# Patient Record
Sex: MALE | Race: WHITE | Employment: FULL TIME | URBAN - METROPOLITAN AREA
[De-identification: names, ages, dates, MRNs, and addresses within clinical notes are randomized per-mention and may not be internally consistent; named-entity substitution may affect disease eponyms.]

---

## 2019-07-18 ENCOUNTER — HOSPITAL ENCOUNTER (OUTPATIENT)
Dept: PHYSICAL THERAPY | Age: 49
Discharge: HOME | End: 2019-07-18
Payer: COMMERCIAL

## 2019-07-18 PROCEDURE — 97110 THERAPEUTIC EXERCISES: CPT

## 2019-07-18 PROCEDURE — 97161 PT EVAL LOW COMPLEX 20 MIN: CPT

## 2019-07-18 PROCEDURE — 97140 MANUAL THERAPY 1/> REGIONS: CPT
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
## 2019-07-18 NOTE — PROGRESS NOTES
PHYSICAL THERAPY - DAILY TREATMENT NOTE    Patient Name: Cherise Yoder        Date: 2019  : 1970   yes Patient  Verified  Visit #:   1     Insurance: Payor: Jodi Caballero / Plan: [address] [facility] PT / Product Type: Commerical /      In time: 3:42 PM Out time: 4:26 PM   Total Treatment Time: 44     Medicare/[facility] Time Tracking (below)   Total Timed Codes (min):  44 1:1 Treatment Time:  n/a     TREATMENT AREA =  Left shoulder pain [M25.512]    SUBJECTIVE  Pain Level (on 0 to 10 scale):  2  / 10   Medication Changes/New allergies or changes in medical history, any new surgeries or procedures?    no  If yes, update Summary List   Subjective Functional Status/Changes:  []  No changes reported     See POC          OBJECTIVE    See exam on chart for details on objective findings        10 min Therapeutic Exercise:  [x]  See flow sheet   Rationale:      increase ROM and increase strength to improve the patients ability to dress and perform household ADL's     10 min Manual Therapy: STM L periscap mm's in R S/L and pecs in supine.   PROM all planes to tolerance (gentle ROM)   Rationale:      decrease pain, increase ROM, increase tissue extensibility and decrease trigger points to improve patient's ability to change/maintain body positions      Billed With/As:   [x] TE   [] TA   [] Neuro   [] Self Care Patient Education: [x] Review HEP    [] Progressed/Changed HEP based on:   [] positioning   [] body mechanics   [] transfers   [] heat/ice application    [x] other:      Other Objective/Functional Measures:    See POC     Post Treatment Pain Level (on 0 to 10) scale:   1  / 10     ASSESSMENT  Assessment/Changes in Function:     See POC     []  See Progress Note/Recertification   Patient will continue to benefit from skilled PT services to modify and progress therapeutic interventions, address ROM deficits, address strength deficits, analyze and address soft tissue restrictions, analyze and cue movement patterns, analyze and modify body mechanics/ergonomics and instruct in home and community integration to attain remaining goals. Progress toward goals / Updated goals:    See POC     PLAN  []  Upgrade activities as tolerated yes Continue plan of care   []  Discharge due to :    []  Other:      Therapist: Arnaldo Davies.  Anna PT    Date: 7/18/2019 Time: 12:20 PM     Future Appointments   Date Time Provider Sherry Dipika   7/23/2019  2:00 PM Perla Corrales, PT MMCPTCP [facility]   7/25/2019  3:45 PM Perla Corrales, PT [id number] [facility]   7/30/2019  1:15 PM Babs RAINES, PT UHEPOBC [facility]   8/1/2019  2:15 PM Perla Corrales, PT [id number] [facility]   8/5/2019  7:45 AM Perla Corrales, PT [facility]CP[facility]   8/7/2019  3:00 PM Antelmo Lion, PT [facility]   8/12/2019  7:00 AM Babs RAINES, PT [facility]   8/14/2019  3:00 PM Antelmo Lion, PT [facility]   8/20/2019  7:00 AM Massiel Santillan, PTA [facility]CP[facility]   8/22/2019  7:00 AM Massiel Santillan PTA [facility]CP[facility]   8/27/2019  7:00 AM Massiel Santillan PTA [facility]CP[facility]   8/29/2019  7:00 AM Massiel Santillan PTA [facility]CP[facility]

## 2019-07-23 ENCOUNTER — HOSPITAL ENCOUNTER (OUTPATIENT)
Dept: PHYSICAL THERAPY | Age: 49
Discharge: HOME | End: 2019-07-23
Payer: COMMERCIAL

## 2019-07-23 PROCEDURE — 97140 MANUAL THERAPY 1/> REGIONS: CPT

## 2019-07-23 PROCEDURE — 97110 THERAPEUTIC EXERCISES: CPT

## 2019-07-23 NOTE — PROGRESS NOTES
PHYSICAL THERAPY - DAILY TREATMENT NOTE    Patient Name: Emeterio Dee        Date: 2019  : 1970   yes Patient  Verified  Visit #:   2     Insurance: Payor: Justine Johnston / Plan: [address] PT / Product Type: Commerical /      In time: 1:57 Out time: 2:46   Total Treatment Time: 49     Medicare/[facility] Time Tracking (below)   Total Timed Codes (min):  49 1:1 Treatment Time:  n/a     TREATMENT AREA =  Left shoulder pain [M25.512]    SUBJECTIVE  Pain Level (on 0 to 10 scale):  1  / 10   Medication Changes/New allergies or changes in medical history, any new surgeries or procedures?    no  If yes, update Summary List   Subjective Functional Status/Changes:  []  No changes reported   I have been doing my stretches at home. OBJECTIVE    39 min Therapeutic Exercise:  [x]  See flow sheet   Rationale:      increase ROM and increase strength to improve the patients ability to dress and perform household ADL's     10 min Manual Therapy: STM L periscap mm's in R S/L and pecs in supine. PROM all planes to tolerance (gentle ROM) with oscillations   Rationale:      decrease pain, increase ROM, increase tissue extensibility and decrease trigger points to improve patient's ability to change/maintain body positions      Billed With/As:   [x] TE   [] TA   [] Neuro   [] Self Care Patient Education: [x] Review HEP    [] Progressed/Changed HEP based on:   [] positioning   [] body mechanics   [] transfers   [] heat/ice application    [x] other:      Other Objective/Functional Measures:   - Demo and cuing for all TE  - No pain with exercises  - Minimal pain with PROM   Post Treatment Pain Level (on 0 to 10) scale:   0  / 10     ASSESSMENT  Assessment/Changes in Function:   Initiated therex today per flow sheet, requiring vc and demo 100% of the time for proper form and technique. Good effort by patient with no increase in pain, good return demo.  Patient declined ice and denies pain at conclusion of session. []  See Progress Note/Recertification   Patient will continue to benefit from skilled PT services to modify and progress therapeutic interventions, address ROM deficits, address strength deficits, analyze and address soft tissue restrictions, analyze and cue movement patterns, analyze and modify body mechanics/ergonomics and instruct in home and community integration to attain remaining goals. Progress toward goals / Updated goals:  First visit since evaluation, no change yet.      PLAN  []  Upgrade activities as tolerated yes Continue plan of care   []  Discharge due to :    []  Other:      Therapist: SHAWNA Han    Date: 7/23/2019 Time: 2:46 PM     Future Appointments   Date Time Provider Sherry Bar   7/23/2019  2:00 PM [facility] PT ESTELA CALABRESED 1 MMCP[facility]   7/25/2019  3:45 PM Dawne Goodpasture., PT [facility]   7/30/2019  1:15 PM Leticia RAINES, PT [id number] [facility]   8/1/2019  2:15 PM Dawne Goodpasture., PT MMCPTCP [facility]   8/5/2019  7:45 AM Leticia RAINES, PT MMCPTCP [facility]   8/7/2019  3:00 PM Estephania Madera, PT MMCPTCP [facility]   8/12/2019  7:00 AM Leticia RAINES, PT MMCPTCP [facility]   8/14/2019  3:00 PM Estephania Madera, PT MMCPTCP [facility]   8/20/2019  7:00 AM Kal Enter, PTA MMCPTCP [facility]   8/22/2019  7:00 AM Kal Enter, PTA MMCPTCP [facility]   8/27/2019  7:00 AM Kal Enter, PTA MMCPTCP [facility]   8/29/2019  7:00 AM Kal Enter, PTA [facility]

## 2019-07-25 ENCOUNTER — HOSPITAL ENCOUNTER (OUTPATIENT)
Dept: PHYSICAL THERAPY | Age: 49
Discharge: HOME | End: 2019-07-25
Payer: COMMERCIAL

## 2019-07-25 PROCEDURE — 97110 THERAPEUTIC EXERCISES: CPT

## 2019-07-25 PROCEDURE — 97140 MANUAL THERAPY 1/> REGIONS: CPT

## 2019-07-25 NOTE — PROGRESS NOTES
PHYSICAL THERAPY - DAILY TREATMENT NOTE    Patient Name: Tyron Tao        Date: 2019  : 1970   yes Patient  Verified  Visit #:   3     Insurance: Payor: Chelo Overall / [address]: [address] / Product Type: Commerical /      In time: 3:42 PM Out time: 4:23   Total Treatment Time: 41     Medicare/[employer] Time Tracking (below)   Total Timed Codes (min):  36 1:1 Treatment Time:  n/a     TREATMENT AREA =  Left shoulder pain [M25.512]    SUBJECTIVE  Pain Level (on 0 to 10 scale):  1  / 10   Medication Changes/New allergies or changes in medical history, any new surgeries or procedures?    no  If yes, update Summary List   Subjective Functional Status/Changes:  []  No changes reported   Pt states he has been trying to keep his arm limber (demonstrates shoulder circles; pt was advised on precautions and to stick to HEP for AAROM). OBJECTIVE    26 min Therapeutic Exercise:  [x]  See flow sheet   Rationale:      increase ROM and increase strength to improve the patients ability to dress and perform household ADL's     10 min Manual Therapy: STM L periscap mm's in R S/L and pecs in supine. PROM all planes to tolerance (gentle ROM) with oscillations   Rationale:      decrease pain, increase ROM, increase tissue extensibility and decrease trigger points to improve patient's ability to change/maintain body positions      Billed With/As:   [x] TE   [] TA   [] Neuro   [] Self Care Patient Education: [x] Review HEP    [] Progressed/Changed HEP based on:   [] positioning   [] body mechanics   [] transfers   [] heat/ice application    [x] other:      Other Objective/Functional Measures:     -added prone letters; pt reported discomfort/pain after completing M and W's (plan to d/c next session). Post Treatment Pain Level (on 0 to 10) scale:   0  / 10     ASSESSMENT  Assessment/Changes in Function:   Pt demonstrates good improvement with AA/PROM to L shoulder.   Shoulder scaption ~ 160 PROM today, flex ~ 150. Pt with c/o impingement type pain with attempted pec stretching and scap exercises. Continue to monitor for skilled progression to goals. []  See Progress Note/Recertification   Patient will continue to benefit from skilled PT services to modify and progress therapeutic interventions, address ROM deficits, address strength deficits, analyze and address soft tissue restrictions, analyze and cue movement patterns, analyze and modify body mechanics/ergonomics and instruct in home and community integration to attain remaining goals. Progress toward goals / Updated goals:       PLAN  []  Upgrade activities as tolerated yes Continue plan of care   []  Discharge due to :    []  Other:      Therapist: Leandra Vicente.  Anna PT, LPTA    Date: 7/25/2019 Time: 4:25 PM      Future Appointments   Date Time Provider Sherry Bar   7/25/2019  3:45 PM Jayie Asp., PT [facility]   7/30/2019  1:15 PM Mary Alice RAINES, PT IFGULIZ [facility]   8/1/2019  2:15 PM Bella Asp., PT PMSEAOQ [facility]   8/5/2019  7:45 AM Bella Asp., PT [facility]CP[facility]   8/7/2019  3:00 PM Drea Jessica, PT [facility]CPTCP [facility]   8/12/2019  7:00 AM Mary Alice RAINES, PT [facility]CPTCP [facility]   8/14/2019  3:00 PM Drea Jessica, PT [facility]CPTCP [facility]   8/20/2019  7:00 AM Claude Vazquez, PTA [facility]CP[facility]   8/22/2019  7:00 AM Claude Vazquez PTA [facility]CPTCP [facility]   8/27/2019  7:00 AM Claude Vazquez PTA [facility]CPTCP [facility]   8/29/2019  7:00 AM Claude Vazquez PTA [facility]CPTCP [facility]

## 2019-07-30 ENCOUNTER — HOSPITAL ENCOUNTER (OUTPATIENT)
Dept: PHYSICAL THERAPY | Age: 49
Discharge: HOME | End: 2019-07-30
Payer: COMMERCIAL

## 2019-07-30 PROCEDURE — 97110 THERAPEUTIC EXERCISES: CPT

## 2019-07-30 PROCEDURE — 97140 MANUAL THERAPY 1/> REGIONS: CPT

## 2019-07-30 NOTE — PROGRESS NOTES
PHYSICAL THERAPY - DAILY TREATMENT NOTE    Patient Name: Naomi Fry        Date: 2019  : 1970   yes Patient  Verified  Visit #:   4     Insurance: Payor: Nazanin Lai / Plan: [address] PT / Product Type: Commerical /      In time: 1:18 PM Out time: 2:01   Total Treatment Time: 43     Medicare/[facility] Time Tracking (below)   Total Timed Codes (min):  38 1:1 Treatment Time:  n/a     TREATMENT AREA =  Left shoulder pain [M25.512]    SUBJECTIVE  Pain Level (on 0 to 10 scale):  1  / 10   Medication Changes/New allergies or changes in medical history, any new surgeries or procedures?    no  If yes, update Summary List   Subjective Functional Status/Changes:  []  No changes reported     Pt states he fell asleep in a recliner yesterday and woke up with his shoulder feeling significantly better. Continues to have tightness to his lats. OBJECTIVE    28 min Therapeutic Exercise:  [x]  See flow sheet   Rationale:      increase ROM and increase strength to improve the patients ability to dress and perform household ADL's     10 min Manual Therapy: STM L periscap mm's in R S/L and pecs in supine. PROM all planes to tolerance (gentle ROM) with oscillations. Lat stretch   Rationale:      decrease pain, increase ROM, increase tissue extensibility and decrease trigger points to improve patient's ability to change/maintain body positions      Billed With/As:   [x] TE   [] TA   [] Neuro   [] Self Care Patient Education: [x] Review HEP    [] Progressed/Changed HEP based on:   [] positioning   [] body mechanics   [] transfers   [] heat/ice application    [x] other:      Other Objective/Functional Measures:     -added strap IR stretch, lat stretch, supine scap retraction   Post Treatment Pain Level (on 0 to 10) scale:   0  / 10     ASSESSMENT  Assessment/Changes in Function:   Pt with good tolerance for all exercises performed without increased pain reported.   Pt continues to adhere to MD guidelines. Will be leaving [facility] on 8/8 and unable to attend MD f/u; pt working on contacting MD office to re-schedule. []  See Progress Note/Recertification   Patient will continue to benefit from skilled PT services to modify and progress therapeutic interventions, address ROM deficits, address strength deficits, analyze and address soft tissue restrictions, analyze and cue movement patterns, analyze and modify body mechanics/ergonomics and instruct in home and community integration to attain remaining goals. Progress toward goals / Updated goals:    Met STG 1,2     PLAN  []  Upgrade activities as tolerated yes Continue plan of care   []  Discharge due to :    []  Other:      Therapist: Balbina Dodd, PT, LPTA    Date: 7/30/2019 Time: 4:25 PM      Future Appointments   Date Time Provider Sherry Bar   8/1/2019  2:15 PM Joleen Lesches., PT [facility]   8/5/2019  7:45 AM Conchita Buerger D., PT [facility]   8/7/2019  3:00 PM Nixon Alvarado, PT [facility]CPTCP [facility]   8/12/2019  7:00 AM Conchita Buerger D., PT [facility]CP[facility]   8/14/2019  3:00 PM Nixon Alvarado, PT [facility]CPTCP [facility]   8/20/2019  7:00 AM Daivd Class, PTA MMCPTCP [facility]   8/22/2019  7:00 AM Daivd Class, PTA MMCPTCP [facility]   8/27/2019  7:00 AM Daivd Class, PTA MMCPTCP [facility]   8/29/2019  7:00 AM Daivd Class, PTA MMCPTCP [facility]

## 2019-08-01 ENCOUNTER — HOSPITAL ENCOUNTER (OUTPATIENT)
Dept: PHYSICAL THERAPY | Age: 49
Discharge: HOME | End: 2019-08-01
Payer: COMMERCIAL

## 2019-08-01 PROCEDURE — 97110 THERAPEUTIC EXERCISES: CPT

## 2019-08-01 PROCEDURE — 97140 MANUAL THERAPY 1/> REGIONS: CPT
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
## 2019-08-05 ENCOUNTER — HOSPITAL ENCOUNTER (OUTPATIENT)
Dept: PHYSICAL THERAPY | Age: 49
Discharge: HOME | End: 2019-08-05
Payer: COMMERCIAL

## 2019-08-05 PROCEDURE — 97140 MANUAL THERAPY 1/> REGIONS: CPT

## 2019-08-05 PROCEDURE — 97110 THERAPEUTIC EXERCISES: CPT

## 2019-08-05 NOTE — PROGRESS NOTES
PHYSICAL THERAPY - DAILY TREATMENT NOTE    Patient Name: Jocy Khan        Date: 2019  : 1970   yes Patient  Verified  Visit #:   6     Insurance: Payor: Va Salas / Plan: [address] PT / Product Type: Commerical /      In time: 7:47  AM Out time: 8:45 AM   Total Treatment Time: 58     Medicare/BCBS Time Tracking (below)   Total Timed Codes (min):  43 1:1 Treatment Time:  n/a     TREATMENT AREA =  Left shoulder pain [M25.512]    SUBJECTIVE  Pain Level (on 0 to 10 scale):  1  / 10   Medication Changes/New allergies or changes in medical history, any new surgeries or procedures?    no  If yes, update Summary List   Subjective Functional Status/Changes:  []  No changes reported     \"I woke up at 4 AM yesterday morning with 5/10 pain; went down to the recliner to get some sleep\".        OBJECTIVE  Modality rationale: decrease pain to improve the patients ability to reach for ADL's   Min Type Additional Details    [] Estim:  []Unatt       []IFC  []Premod                        []Other:  []w/ice   []w/heat  Position:  Location:    [] Estim: []Att    []TENS instruct  []NMES                    []Other:  []w/US   []w/ice   []w/heat  Position:  Location:    []  Traction: [] Cervical       []Lumbar                       [] Prone          []Supine                       []Intermittent   []Continuous Lbs:  [] before manual  [] after manual    []  Ultrasound: []Continuous   [] Pulsed                           []1MHz   []3MHz Location:  W/cm2:    []  Iontophoresis with dexamethasone         Location: [] Take home patch   [] In clinic   10 [x]  Ice     []  heat  []  Ice massage  []  Laser   []  Anodyne Position: reclined  Location:L shoulder    []  Laser with stim  []  Other: Position:  Location:    []  Vasopneumatic Device Pressure:       [] lo [] med [] hi   Temperature: [] lo [] med [] hi   [x] Skin assessment post-treatment:  [x]intact []redness- no adverse reaction    []redness  adverse reaction:       33 min Therapeutic Exercise:  [x]  See flow sheet   Rationale:      increase ROM and increase strength to improve the patients ability to dress and perform household ADL's     10 min Manual Therapy: STM L post. cuff and pecs. PROM all planes to tolerance (gentle ROM) with oscillations. Lat stretch   Rationale:      decrease pain, increase ROM, increase tissue extensibility and decrease trigger points to improve patient's ability to change/maintain body positions      Billed With/As:   [x] TE   [] TA   [] Neuro   [] Self Care Patient Education: [x] Review HEP    [] Progressed/Changed HEP based on:   [] positioning   [] body mechanics   [] transfers   [] heat/ice application    [x] other:      Other Objective/Functional Measures:     A/PROM supine post exercises and manual:   Flex 155/159, abd 125, ER 52, IR 45; all motions with end range pain c/o.    -attempted 1 lb. Row; unable to tolerate; remains AROM   Post Treatment Pain Level (on 0 to 10) scale:   1 / 10     ASSESSMENT  Assessment/Changes in Function:   Pt demonstrates significant improvement in shoulder ROM as per above. Pt progressing well with current POC and will continue advancement as tolerated. Pt plans to f/u with MD 8/20. []  See Progress Note/Recertification   Patient will continue to benefit from skilled PT services to modify and progress therapeutic interventions, address ROM deficits, address strength deficits, analyze and address soft tissue restrictions, analyze and cue movement patterns, analyze and modify body mechanics/ergonomics and instruct in home and community integration to attain remaining goals. Progress toward goals / Updated goals:    8/5: met STG 1,2, and  LTG 3    · Short Term Goals: To be accomplished in  3  weeks:  1. Pt will be educated in appropriate HEP to decrease pain, increase ROM, increase strength and return pt to PLOF.    2.  Pt will increase L shoulder PROM to 120 for increased ease with dressing tasks. 3. Pt will improve FOTO score to >/= 39 to demonstrate a significant improvement in functional activity tolerance.     · Long Term Goals: To be accomplished in  6-8  weeks:  1. Pt will improve FOTO score to >/= 67 to demo a significant improvement in functional activity tolerance. 2. Pt will achieve >/= +5 GROC in order to promote increased activity tolerance. 3. Pt will increase L shoulder ER AROM >/= 45 deg for ease with dressing tasks. 4. Pt will increase L shoulder flexion AROM in standing to >/= 160 for ease to place items in overhead shelf.           PLAN  []  Upgrade activities as tolerated yes Continue plan of care   []  Discharge due to :    []  Other:      Therapist: Yoandy Posada.  Yanely Winters, PT    Date: 8/5/2019 Time: 9:21 AM      Future Appointments   Date Time Provider [place]   8/5/2019  7:45 AM Lilia Lucas, PT [facility]   8/7/2019  3:00 PM Edwin Denny, PT [facility]   8/12/2019  7:00 AM Sam RAINES, PT [facility]   8/14/2019  3:00 PM Edwin Denny, PT [facility]   8/20/2019  7:00 AM Geoff Bautista, PTA [facility]   8/22/2019  7:00 AM Geoff Bautista, PTA [facility]   8/27/2019  7:00 AM Geoff Bautista, PTA [facility]   8/29/2019  7:00 AM Geoff Bautista, PTA [facility]

## 2019-08-07 ENCOUNTER — HOSPITAL ENCOUNTER (OUTPATIENT)
Dept: PHYSICAL THERAPY | Age: 49
Discharge: HOME | End: 2019-08-07
Payer: COMMERCIAL

## 2019-08-07 PROCEDURE — 97140 MANUAL THERAPY 1/> REGIONS: CPT

## 2019-08-07 PROCEDURE — 97110 THERAPEUTIC EXERCISES: CPT

## 2019-08-07 NOTE — PROGRESS NOTES
PHYSICAL THERAPY - DAILY TREATMENT NOTE    Patient Name: Estrada Pandeyn        Date: 2019  : 1970   yes Patient  Verified  Visit #:     Insurance: Payor: Leticia Schaefer / Plan: [address] PT / Product Type: Commerical /      In time: 2:58 Out time: 4:57   Total Treatment Time: 59     Medicare/[facility] Time Tracking (below)   Total Timed Codes (min):  na 1:1 Treatment Time:  na     TREATMENT AREA =  Left shoulder pain [M25.512]    SUBJECTIVE  Pain Level (on 0 to 10 scale):  1.5  / 10   Medication Changes/New allergies or changes in medical history, any new surgeries or procedures?    no  If yes, update Summary List   Subjective Functional Status/Changes:  []  No changes reported     \"I overdid it pushing/pulling a pipe at work yesterday so I am a little sore. Not bad though. I iced as soon as I got home\" Denies any significant increases in pain but continues to note disrupted sleep due to the L shoulder.           OBJECTIVE      44 min Therapeutic Exercise:  [x]  See flow sheet   Rationale:      increase ROM and increase strength to improve the patients ability to carry, push, pull     15 min Manual Therapy: TPR/DTM to L post cuff musculature, lat dorsi, pec minor, ant delt; PROM flex, ER/IR In scapular plane to tolerance, lat stretch w/ scap block   Rationale:      decrease pain, increase ROM, increase tissue extensibility and decrease trigger points to improve patient's ability to reach    Billed With/As:   [x] TE   [] TA   [] Neuro   [] Self Care Patient Education: [x] Review HEP    [] Progressed/Changed HEP based on:   [] positioning   [] body mechanics   [] transfers   [] heat/ice application    [] other:      Other Objective/Functional Measures:    ER AAROM to 55 post manual therapy  Inc resistance on UBE this visit to improve shoulder strength/stability     Post Treatment Pain Level (on 0 to 10) scale:   0  / 10     ASSESSMENT  Assessment/Changes in Function:     Pt reported inc ease w/ therex by performing manual therapy prior to therex. Reported absent pain following session. []  See Progress Note/Recertification   Patient will continue to benefit from skilled PT services to modify and progress therapeutic interventions, address functional mobility deficits, address ROM deficits, address strength deficits, analyze and address soft tissue restrictions, analyze and cue movement patterns, analyze and modify body mechanics/ergonomics and instruct in home and community integration to attain remaining goals. Progress toward goals / Updated goals:    8/5: met STG 1,2, and  LTG 3     · Short Term Goals: To be accomplished in  3  weeks:  1.  Pt will be educated in appropriate HEP to decrease pain, increase ROM, increase strength and return pt to PLOF.    2. Pt will increase L shoulder PROM to 120 for increased ease with dressing tasks. 3. Pt will improve FOTO score to >/= 39 to demonstrate a significant improvement in functional activity tolerance.     · Long Term Goals: To be accomplished in  6-8  weeks:  1. Pt will improve FOTO score to >/= 67 to demo a significant improvement in functional activity tolerance. 2. Pt will achieve >/= +5 GROC in order to promote increased activity tolerance. 3. Pt will increase L shoulder ER AROM >/= 45 deg for ease with dressing tasks. 4. Pt will increase L shoulder flexion AROM in standing to >/= 160 for ease to place items in overhead shelf.  8/7/19: goal in progress, flex AAROM 175       PLAN  []  Upgrade activities as tolerated yes Continue plan of care   []  Discharge due to :    []  Other:      Therapist: Kya Maria PT    Date: 8/7/2019 Time: 3:01 PM     Future Appointments   Date Time Provider [place]   8/12/2019  7:00 AM Huong Alvarez, PT [facility]   8/14/2019  3:00 PM Piper Padilla PT [facility]   8/20/2019  7:00 AM Jennifer Oliveira PTA [facility]   8/22/2019  7:00 AM Jennifer Oliveira PTA [facility]   8/27/2019 7:00 AM Marvine Range, PTA [facility]   8/29/2019  7:00 AM Marvine Range, PTA [facility]

## 2019-08-12 ENCOUNTER — HOSPITAL ENCOUNTER (OUTPATIENT)
Dept: PHYSICAL THERAPY | Age: 49
Discharge: HOME | End: 2019-08-12
Payer: COMMERCIAL

## 2019-08-12 PROCEDURE — 97110 THERAPEUTIC EXERCISES: CPT

## 2019-08-12 PROCEDURE — 97140 MANUAL THERAPY 1/> REGIONS: CPT

## 2019-08-12 NOTE — PROGRESS NOTES
PHYSICAL THERAPY - DAILY TREATMENT NOTE    Patient Name: Aicha Members        Date: 2019  : 1970   yes Patient  Verified  Visit #:     Insurance: Payor: Jeff Madera / Plan: [address] PT / Product Type: Commerical /      In time: 7:00 AM Out time: 7:53   Total Treatment Time: 53     Medicare/BCBS Time Tracking (below)   Total Timed Codes (min):  48 1:1 Treatment Time:  na     TREATMENT AREA =  Left shoulder pain [M25.512]    SUBJECTIVE  Pain Level (on 0 to 10 scale):  1  / 10   Medication Changes/New allergies or changes in medical history, any new surgeries or procedures?    no  If yes, update Summary List   Subjective Functional Status/Changes:  []  No changes reported     Pt states woke up with anterior shoulder pain ~2/10. Did ok over the weekend and did not do any lifting while moving dtr to new dorm. Will see Dr. Almas Ortiz        OBJECTIVE      38 min Therapeutic Exercise:  [x]  See flow sheet   Rationale:      increase ROM and increase strength to improve the patients ability to carry, push, pull     10 min Manual Therapy: TPR/DTM to L post cuff musculature, lat dorsi, pec minor, ant delt; PROM flex, ER/IR In scapular plane to tolerance, lat stretch w/ scap block   Rationale:      decrease pain, increase ROM, increase tissue extensibility and decrease trigger points to improve patient's ability to reach    Billed With/As:   [x] TE   [] TA   [] Neuro   [] Self Care Patient Education: [x] Review HEP    [] Progressed/Changed HEP based on:   [] positioning   [] body mechanics   [] transfers   [] heat/ice application    [] other:      Other Objective/Functional Measures:    -added sleeper stretch  -reports min discomfort after completing prone rows; pain c/o with attempt at \"W\"     Post Treatment Pain Level (on 0 to 10) scale:   1  / 10     ASSESSMENT  Assessment/Changes in Function:     Pt demonstrating IR>ER>flex/abd restrictions; ER, flex/abd approaching WNL.   Able to perform all stretches with good form indicating good compliance with HEP. []  See Progress Note/Recertification   Patient will continue to benefit from skilled PT services to modify and progress therapeutic interventions, address functional mobility deficits, address ROM deficits, address strength deficits, analyze and address soft tissue restrictions, analyze and cue movement patterns, analyze and modify body mechanics/ergonomics and instruct in home and community integration to attain remaining goals. Progress toward goals / Updated goals:    8/5: met STG 1,2, and  LTG 3     · Short Term Goals: To be accomplished in  3  weeks:  1.  Pt will be educated in appropriate HEP to decrease pain, increase ROM, increase strength and return pt to PLOF.    2. Pt will increase L shoulder PROM to 120 for increased ease with dressing tasks. 3. Pt will improve FOTO score to >/= 39 to demonstrate a significant improvement in functional activity tolerance.     · Long Term Goals: To be accomplished in  6-8  weeks:  1. Pt will improve FOTO score to >/= 67 to demo a significant improvement in functional activity tolerance. 2. Pt will achieve >/= +5 GROC in order to promote increased activity tolerance. 3. Pt will increase L shoulder ER AROM >/= 45 deg for ease with dressing tasks. 4. Pt will increase L shoulder flexion AROM in standing to >/= 160 for ease to place items in overhead shelf. 8/7/19: goal in progress, flex AAROM 175       PLAN  []  Upgrade activities as tolerated yes Continue plan of care   []  Discharge due to :    []  Other:      Therapist: Maribel Benson.  Saima Vasquez, PT    Date: 8/12/2019 Time: 7:56 AM      Future Appointments   Date Time Provider [place]   8/14/2019  3:00 PM Latha Berman, PT [facility]   8/20/2019  7:00 AM [facility]   8/22/2019  7:00 AM Cheryl Leyva PTA [facility]   8/27/2019  7:00 AM [facility]   8/29/2019  7:00 AM Cheryl Leyva PTA [facility]

## 2019-08-14 ENCOUNTER — HOSPITAL ENCOUNTER (OUTPATIENT)
Dept: PHYSICAL THERAPY | Age: 49
Discharge: HOME | End: 2019-08-14
Payer: COMMERCIAL

## 2019-08-14 PROCEDURE — 97140 MANUAL THERAPY 1/> REGIONS: CPT

## 2019-08-14 PROCEDURE — 97110 THERAPEUTIC EXERCISES: CPT

## 2019-08-14 NOTE — PROGRESS NOTES
[facility] [facility] PHYSICAL THERAPY  [address], [place], [address] - Phone: [phone]  Fax: [phone]  PROGRESS NOTE  Patient Name: Annette Sims : 1970   Treatment/Medical Diagnosis: Left shoulder pain [M25.512]   Referral Source: Leonor Linares MD     Date of Initial Visit: 19 Attended Visits: 9 Missed Visits: 0     SUMMARY OF TREATMENT  Pt has attended 9 sessions of PT for the tx of L shoulder p! S/p closed displaced fx of lesser tuberosity of humerus. PT tx has consisted of therex, manual therapy, and HEP in order to improve ROM, joint mobility, flexibility, and dec pain. CURRENT STATUS  Pt reports 75% overall improvement in sx/functionality since beginning PT. Pt notes 5/10 max pain, which occurred later in the evening after spending the day [place] the beach, avg pain 1/10. Notes inc ease w/ bathing/dressing and reaching, continued difficulty w/ work duties and discomfort w/ driving. Continues w/ soft tissue restrictions to posterior cuff, lats, and pec minor. PROM: flex 175, abd 170, ER in scapular plane 48  AROM: flex 172, abd 168, ER in neutral 50, ext 48, FIR to L5  FOTO: 59/100, GROC +7.    Goal/Measure of Progress Goal Met? 1. Pt will improve FOTO score to >/= 67 to demo a significant improvement in functional activity tolerance. Status at last Eval: 29 Current Status: 59 progressing   2. Pt will achieve >/= +5 GROC in order to promote increased activity tolerance. Status at last Eval: n/a Current Status: +7 yes   3. Pt will increase L shoulder ER AROM >/= 45 deg for ease with dressing tasks. Status at last Eval: 15 Current Status: 50 yes     3. Pt will increase L shoulder flexion AROM in standing to >/= 160 for ease to place items in overhead shelf   Status at last Eval: 85 Current Status: 172 yes     New Goals to be achieved in __4__  weeks:  1.  Pt will improve FOTO score to >/= 67/100 to demo a significant improve in functional activity tolerance  2. Pt will demo L shoulder FIR AROM to at least T10 for ease of self-care  3. Improve L shoulder MMT to grossly 4/5 for progression towards return to full work duties    RECOMMENDATIONS  Pt has progressed excellently towards goals. Recommend pt continue PT 2x/wk for addition 4 weeks w/ progression towards strength training in order to enable pt return to PLOF. Please advise. Thank you for this referral.     If you have any questions/comments please contact us directly at [phone]. Thank you for allowing us to assist in the care of your patient. Therapist Signature: Anish Acosta PT Date: 8/14/2019     Time: 3:06 PM   NOTE TO PHYSICIAN:  PLEASE COMPLETE THE ORDERS BELOW AND FAX TO   [facility] Physical Therapy: [phone]  If you are unable to process this request in 24 hours please contact our office: [phone]    ___ I have read the above report and request that my patient continue as recommended.   ___ I have read the above report and request that my patient continue therapy with the following changes/special instructions:_________________________________________________________   ___ I have read the above report and request that my patient be discharged from therapy.      Physician Signature:        Date:       Time:

## 2019-08-14 NOTE — PROGRESS NOTES
PHYSICAL THERAPY - DAILY TREATMENT NOTE    Patient Name: Jocy Khan        Date: 2019  : 1970   yes Patient  Verified  Visit #:     Insurance: Payor: Va Salas / [facility] [address] [facility] / Product Type: Commerical /      In time: 3:00 Out time: 4:12   Total Treatment Time: 72     Medicare/[employer]BS Time Tracking (below)   Total Timed Codes (min):  na 1:1 Treatment Time:  na     TREATMENT AREA =  Left shoulder pain [M25.512]    SUBJECTIVE  Pain Level (on 0 to 10 scale):  1  / 10   Medication Changes/New allergies or changes in medical history, any new surgeries or procedures?    no  If yes, update Summary List   Subjective Functional Status/Changes:  []  No changes reported     See PN          OBJECTIVE    57 min Therapeutic Exercise:  [x]  See flow sheet   Rationale:      increase ROM and increase strength to improve the patients ability to complete carrying tasks    15 min Manual Therapy: TPR to R post cuff, subscap, ant delt, pec minor, lat dorsi, lat str w/ scap block, GHJ ER str in scapular plane   Rationale:      decrease pain, increase ROM, increase tissue extensibility and decrease trigger points to improve patient's ability to complete ADLs    Billed With/As:   [x] TE   [] TA   [] Neuro   [] Self Care Patient Education: [x] Review HEP    [] Progressed/Changed HEP based on:   [] positioning   [] body mechanics   [] transfers   [] heat/ice application    [] other:      Other Objective/Functional Measures:    See PN     Post Treatment Pain Level (on 0 to 10) scale:   0  / 10     ASSESSMENT  Assessment/Changes in Function:     See PN     [x]  See Progress Note/Recertification   Patient will continue to benefit from skilled PT services to modify and progress therapeutic interventions, address functional mobility deficits, address ROM deficits, address strength deficits, analyze and address soft tissue restrictions, analyze and cue movement patterns, analyze and modify body mechanics/ergonomics, assess and modify postural abnormalities and instruct in home and community integration to attain remaining goals.    Progress toward goals / Updated goals:    See PN     PLAN  []  Upgrade activities as tolerated yes Continue plan of care   []  Discharge due to :    []  Other:      Therapist: Vito Caban PT    Date: 8/14/2019 Time: 3:04 PM     Future Appointments   Date Time Provider [place]ry [place]   8/20/2019  7:00 AM Donnie Orr PTA [facility]   8/22/2019  7:00 AM Donnie Orr PTA [facility]   8/27/2019  7:00 AM Donnie Orr PTA [facility]   8/29/2019  7:00 AM Donnie Orr PTA [facility]

## 2019-08-20 ENCOUNTER — HOSPITAL ENCOUNTER (OUTPATIENT)
Dept: PHYSICAL THERAPY | Age: 49
Discharge: HOME | End: 2019-08-20
Payer: COMMERCIAL

## 2019-08-20 PROCEDURE — 97110 THERAPEUTIC EXERCISES: CPT

## 2019-08-20 PROCEDURE — 97140 MANUAL THERAPY 1/> REGIONS: CPT

## 2019-08-20 NOTE — PROGRESS NOTES
PT DAILY TREATMENT NOTE     Patient Name: Aury Green  Date:2019  : 1970  [x]  Patient  Verified  Payor: Javier Mcknight / Plan: [employer]  [facility] / Product Type: Commerical /    In time:7:01  Out time:8:05  Total Treatment Time (min): 64  Total Timed Codes (min): 59  1:1 Treatment Time (min):    Visit #: 10 of 17    Treatment Area: Left shoulder pain [M25.512]    SUBJECTIVE  Pain Level (0-10 scale): 1-2/10  Any medication changes, allergies to medications, adverse drug reactions, diagnosis change, or new procedure performed?: [x] No    [] Yes (see summary sheet for update)  Subjective functional status/changes:   [] No changes reported  My shoulder has mainly just been stiff and I still wake about once an hour from the pain and discomfort from my arm being in one position for too long. OBJECTIVE      52 min Therapeutic Exercise:  [x] See flow sheet :   Rationale: increase ROM to improve the patients ability to improve functional abilities     12 min Manual Therapy:  STM/tissue mobs to posterior scapular musculature, sidelying scapular glides and GH PROM in all planes   Rationale: increase ROM and increase tissue extensibility to improve functional mobility        min Patient Education: [x] Review HEP    [] Progressed/Changed HEP based on:   [] positioning   [] body mechanics   [] transfers   [] heat/ice application        Other Objective/Functional Measures:     Pain Level (0-10 scale) post treatment: 1-2/10    ASSESSMENT/Changes in Function: Pt continues to be limited the most with shoulder ER mobility, but is progressing well with mobility in all other planes. Pt also presents with continued congestion/tension in inferior scapular musculature with manual intervention. Otherwise,Pt presents with chief c/o overall intermittent stiffness in shoulder and proximal UE. Will continue to progress/advance patient within current POC as tolerated with monitoring symptoms.      Patient will continue to benefit from skilled PT services to modify and progress therapeutic interventions, address functional mobility deficits, address ROM deficits, analyze and address soft tissue restrictions, analyze and cue movement patterns, analyze and modify body mechanics/ergonomics and assess and modify postural abnormalities to attain remaining goals. []  See Plan of Care  []  See progress note/recertification  []  See Discharge Summary         Progress towards goals / Updated goals:  New Goals to be achieved in __4__  weeks:  1. Pt will improve FOTO score to >/= 67/100 to demo a significant improve in functional activity tolerance  2. Pt will demo L shoulder FIR AROM to at least T10 for ease of self-care  3.  Improve L shoulder MMT to grossly 4/5 for progression towards return to full work duties    PLAN  [x]  Upgrade activities as tolerated     []  Continue plan of care  []  Update interventions per flow sheet       []  Discharge due to:_  []  Other:_      Harmony Peerz PTA 8/20/2019  7:00 AM      Future Appointments   Date Time Provider [place]ry Bar   8/22/2019  7:00 AM Daivd Class, PTA [facility]   8/27/2019  7:00 AM Daivd Class, PTA [facility]   8/29/2019  7:00 AM Daivd Class, PTA [facility]

## 2019-08-22 ENCOUNTER — HOSPITAL ENCOUNTER (OUTPATIENT)
Dept: PHYSICAL THERAPY | Age: 49
Discharge: HOME | End: 2019-08-22
Payer: COMMERCIAL

## 2019-08-22 PROCEDURE — 97140 MANUAL THERAPY 1/> REGIONS: CPT

## 2019-08-22 PROCEDURE — 97110 THERAPEUTIC EXERCISES: CPT

## 2019-08-22 NOTE — PROGRESS NOTES
PT DAILY TREATMENT NOTE     Patient Name: Onel Richardson  Date:2019  : 1970  [x]  Patient  Verified  Payor: Martina Chanel / Plan: [employer]  [facility]IT[facility] PT / Product Type: Commerical /    In time:7:00  Out time:8:08  Total Treatment Time (min): 68  Total Timed Codes (min): 62  1:1 Treatment Time (min):    Visit #: 11 of 17    Treatment Area: Left shoulder pain [M25.512]    SUBJECTIVE  Pain Level (0-10 scale): /10  Any medication changes, allergies to medications, adverse drug reactions, diagnosis change, or new procedure performed?: [x] No    [] Yes (see summary sheet for update)  Subjective functional status/changes:   [] No changes reported  I had a hard time sleeping last night because my neck was bothering me, I ended up having to go sleep in the recliner and my shoulder has been stiff as well. OBJECTIVE    53 min Therapeutic Exercise:  [x] See flow sheet :   Rationale: increase ROM to improve the patients ability to improve functional abilities    15 min Manual Therapy:  STM/tissue mobs to posterior scapular/cervical musculature, sidelying scapular glides and GH PROM in all planes   Rationale: increase ROM, increase tissue extensibility and decrease trigger points to improve functional mobility        min Patient Education: [x] Review HEP    [] Progressed/Changed HEP based on:   [] positioning   [] body mechanics   [] transfers   [] heat/ice application        Other Objective/Functional Measures:     Pain Level (0-10 scale) post treatment: 0    ASSESSMENT/Changes in Function: Pt presents with chief c/o increased left cervical/upper trap tension/pain from irregular sleep positioning since last treatment which was focused on with manual intervention today. Pt is limited the most with end range scaption and ER mobility by pain with manual stretching. Will continue to progress/advance patient within current POC as tolerated with monitoring symptoms.     Patient will continue to benefit from skilled PT services to modify and progress therapeutic interventions, address functional mobility deficits, address ROM deficits, address strength deficits, analyze and address soft tissue restrictions, analyze and cue movement patterns, analyze and modify body mechanics/ergonomics and assess and modify postural abnormalities to attain remaining goals.      []  See Plan of Care  []  See progress note/recertification  []  See Discharge Summary         Progress towards goals / Updated goals:  New Goals to be achieved in __4__  weeks:  1. Pt will improve FOTO score to >/= 67/100 to demo a significant improve in functional activity tolerance  2. Pt will demo L shoulder FIR AROM to at least T10 for ease of self-care:8/22/19: Not Met, Pt is able actively reach behind his back into IR plane to approximately L3 level  3. Improve L shoulder MMT to grossly 4/5 for progression towards return to full work duties       PLAN  [x]  Upgrade activities as tolerated     []  Continue plan of care  []  Update interventions per flow sheet       []  Discharge due to:_  []  Other:_      Sai Dobbs PTA 8/22/2019  6:58 AM      Future Appointments   Date Time Provider Sherry Bar   8/22/2019  7:00 AM Paul Huggins PTA [facility]   8/27/2019  7:00 AM [facility]   8/29/2019  7:00 AM SHERYL Ramos [facility]

## 2019-08-27 ENCOUNTER — HOSPITAL ENCOUNTER (OUTPATIENT)
Dept: PHYSICAL THERAPY | Age: 49
Discharge: HOME | End: 2019-08-27
Payer: COMMERCIAL

## 2019-08-27 PROCEDURE — 97110 THERAPEUTIC EXERCISES: CPT

## 2019-08-27 PROCEDURE — 97140 MANUAL THERAPY 1/> REGIONS: CPT

## 2019-08-27 NOTE — PROGRESS NOTES
PT DAILY TREATMENT NOTE     Patient Name: Annita Dobson  Date:2019  : 1970  [x]  Patient  Verified  Payor: Erica Salinas / Plan: [employer]  CAPITATED PT / Product Type: Commerical /    In time:7:03  Out time:8:05  Total Treatment Time (min): 62  Total Timed Codes (min): 56  1:1 Treatment Time (min):    Visit #: 12 of 17    Treatment Area: Left shoulder pain [M25.512]    SUBJECTIVE  Pain Level (0-10 scale): 2/10  Any medication changes, allergies to medications, adverse drug reactions, diagnosis change, or new procedure performed?: [x] No    [] Yes (see summary sheet for update)  Subjective functional status/changes:   [] No changes reported  I had some pain in my shoulder over the past of days from waking up from sleeping on my side pretty hard a couple of nights in a row and the pain is still hanging in there a little bit. OBJECTIVE      47 min Therapeutic Exercise:  [x] See flow sheet :   Rationale: increase ROM and increase strength to improve the patients ability to improve functional abilities    15 min Manual Therapy:  STM/tissue mobs to posterior scapular/cervical musculature, sidelying scapular glides and GH PROM in all planes   Rationale: decrease pain, increase ROM, increase tissue extensibility and decrease trigger points to improve functional mobility         min Patient Education: [x] Review HEP    [] Progressed/Changed HEP based on:   [] positioning   [] body mechanics   [] transfers   [] heat/ice application        Other Objective/Functional Measures:     Pain Level (0-10 scale) post treatment: 0    ASSESSMENT/Changes in Function: Pt presented with chief c/o intermittent popping at various points with AROM as well as palpable tenderness in sub acromial region with manual intervention today. Otherwise, pt also presents with c/o increased pain/discomfort from prolonged sidelying/sleeping on involved side since last treatment. Will continue to progress/advance patient within current POC as tolerated with monitoring symptoms. Patient will continue to benefit from skilled PT services to modify and progress therapeutic interventions, address functional mobility deficits, address ROM deficits, address strength deficits, analyze and address soft tissue restrictions, analyze and cue movement patterns, analyze and modify body mechanics/ergonomics and assess and modify postural abnormalities to attain remaining goals.      []  See Plan of Care  []  See progress note/recertification  []  See Discharge Summary         Progress towards goals / Updated goals:  New Goals to be achieved in __4__  weeks:  1. Pt will improve FOTO score to >/= 67/100 to demo a significant improve in functional activity tolerance  2. Pt will demo L shoulder FIR AROM to at least T10 for ease of self-care:8/22/19: Not Met, Pt is able actively reach behind his back into IR plane to approximately L3 level  3. Improve L shoulder MMT to grossly 4/5 for progression towards return to full work duties:    PLAN  [x]  Upgrade activities as tolerated     []  Continue plan of care  []  Update interventions per flow sheet       []  Discharge due to:_  []  Other:_      Cassie Olivia PTA 8/27/2019  7:08 AM      Future Appointments   Date Time Provider Sherry Bar   8/29/2019  7:00 AM Donnie Orr PTA [facility]

## 2019-08-29 ENCOUNTER — HOSPITAL ENCOUNTER (OUTPATIENT)
Dept: PHYSICAL THERAPY | Age: 49
Discharge: HOME | End: 2019-08-29
Payer: COMMERCIAL

## 2019-08-29 PROCEDURE — 97140 MANUAL THERAPY 1/> REGIONS: CPT

## 2019-08-29 PROCEDURE — 97110 THERAPEUTIC EXERCISES: CPT

## 2019-09-10 ENCOUNTER — HOSPITAL ENCOUNTER (OUTPATIENT)
Dept: PHYSICAL THERAPY | Age: 49
Discharge: HOME | End: 2019-09-10
Payer: COMMERCIAL

## 2019-09-10 PROCEDURE — 97110 THERAPEUTIC EXERCISES: CPT

## 2019-09-10 PROCEDURE — 97140 MANUAL THERAPY 1/> REGIONS: CPT

## 2019-09-10 NOTE — PROGRESS NOTES
PHYSICAL THERAPY - DAILY TREATMENT NOTE    Patient Name: Mary Chavez        Date: 9/10/2019  : 1970   yes Patient  Verified  Visit #:   15      16  Insurance: Payor: Elmira Broderick / Plan:  [address] [employer] [address] PT / Product Type: Commerical /      In time: 2:15 PM Out time: 3:16   Total Treatment Time: 61     Medicare/BCQurater Time Tracking (below)   Total Timed Codes (min):  55 1:1 Treatment Time:  n/a     TREATMENT AREA =  Left shoulder pain [M25.512]    SUBJECTIVE  Pain Level (on 0 to 10 scale):  0  / 10   Medication Changes/New allergies or changes in medical history, any new surgeries or procedures?    no  If yes, update Summary List   Subjective Functional Status/Changes:  []  No changes reported     Pt states MD f/u last Thurs. went well and Dr. Heather Chou to advance to strengthening and gave him a script (forgot to bring it in). CT scan looked good per pt report.          OBJECTIVE      45 min Therapeutic Exercise:  [x]  See flow sheet   Rationale:      increase ROM and increase strength to improve the patients ability to lift, reach and carry     10 min Manual Therapy: STM/tissue mobs to posterior scapular/cervical musculature/CFM to proximal biceps tendon, sidelying scapular glides and GH PROM in all planes   Rationale:    increase ROM, increase tissue extensibility and decrease trigger points to improve functional mobility           Billed With/As:   [x] TE   [] TA   [] Neuro   [] Self Care Patient Education: [x] Review HEP    [] Progressed/Changed HEP based on:   [] positioning   [] body mechanics   [] transfers   [] heat/ice application    [] other:      Other Objective/Functional Measures:      -increased resistance with standing scaption; pt noted some anterior shoulder pain; abolished with cues for increased post scap tipping  -added bicep curl with cues for eccentric lowering   -added OH wall taps  -progressed to 3 # supine dowel flexion     Post Treatment Pain Level (on 0 to 10) scale:   0  / 10 ASSESSMENT  Assessment/Changes in Function:     Pt demonstrates near full PROM to all planes with most restriction to IR (~50 deg). Able to advance from counter slides and wall ladder today. Able to advance with strengthening without c/o soreness/pain. []  See Progress Note/Recertification   Patient will continue to benefit from skilled PT services to modify and progress therapeutic interventions, address functional mobility deficits, address ROM deficits, address strength deficits, analyze and address soft tissue restrictions, analyze and cue movement patterns, analyze and modify body mechanics/ergonomics and instruct in home and community integration to attain remaining goals. Progress toward goals / Updated goals:    Progressing with strength goals. PLAN  []  Upgrade activities as tolerated yes Continue plan of care   []  Discharge due to :    []  Other:      Therapist: Chandni Stevens.  Klalie Robles PT    Date: 9/10/2019 Time: 2:48 PM     Future Appointments   Date Time Provider Sherry Bar   9/12/2019  9:15 AM Angie Palacios PTA [facility]   9/18/2019  2:45 PM Angie Palacios PTA [facility]   9/20/2019  7:00 AM Angie Palacios PTA [facility]

## 2019-09-12 ENCOUNTER — HOSPITAL ENCOUNTER (OUTPATIENT)
Dept: PHYSICAL THERAPY | Age: 49
Discharge: HOME | End: 2019-09-12
Payer: COMMERCIAL

## 2019-09-12 PROCEDURE — 97140 MANUAL THERAPY 1/> REGIONS: CPT

## 2019-09-12 PROCEDURE — 97110 THERAPEUTIC EXERCISES: CPT

## 2019-09-12 NOTE — PROGRESS NOTES
[facility] [place] [address], [place], [address] [place] [address] Road  Phone: [phone]   [phone]  PROGRESS NOTE  Patient Name: Willie Lai : 1970   Treatment/Medical Diagnosis: Left shoulder pain [M25.512]   Referral Source: Estrada Gutiérrez MD     Date of Initial Visit: 19 Attended Visits: 15 Missed Visits: 0     SUMMARY OF TREATMENT  Pt has attended 15 sessions of PT for the tx of L shoulder p! S/p closed displaced fx of lesser tuberosity of humerus. PT tx has consisted of therex, manual therapy, and HEP in order to improve ROM, joint mobility, flexibility, and dec pain. CURRENT STATUS  Patient reports approximately 90% overall improvement from therapy since initial evaluation with 1/10 pain at the worst with increased repetitive and lifting type ADL's. Pt is making steady progress with gaining shoulder and scapular mobility as well as advancing with strengthening within current POC. Pt would benefit from continued therapy for 8 additional visits to focus on strengthening and achieve maximum medical benefit/potential from current POC. Will continue to progress/advance patient within current POC as tolerated with monitoring symptoms. Left shoulder AROM= Zulzzyx=694 degrees; Zgyfzatjb=298 degrees; ER=78 degrees (measured at approximately 90 degrees of abduction); IR=Pt is able to actively reach behind his back into IR to approximately L2 level  Left shoulder strength measurements/MMT= Flexion=5/5; Abduction=4/5; ER=4/5; IR=4-/5  Goal/Measure of Progress Goal Met? 1. Pt will improve FOTO score to >/= 67/100 to demo a significant improve in functional activity tolerance   Status at last Eval: 59/100 (29/100 at initial evaluation) Current Status: 63/100 no   2. Pt will demo L shoulder FIR AROM to at least T10 for ease of self-care   Status at last Eval: FIR AROM to L5 Current Status: FIR AROM to L2 progressing   3.    Improve L shoulder MMT to grossly 4/5 for progression towards return to full work duties   Status at last Eval: Progressing  Current Status: =/> 4/5 except for IR which is 4-/5 Partially Met     New Goals to be achieved in __8__  treatments: 1. Pt will improve FOTO score to >/= 67/100 to demo a significant improve in functional activity tolerance  2. Pt will demo L shoulder FIR AROM to at least T10 for ease of self-care  3. Improve L shoulder abduction, ER and IR MMT by =/> 1/3 muscle grade for progression towards return to full work duties    RECOMMENDATIONS  Continue with current POC for 8 additional visits with advancing as tolerated, then reassess for the need for continuation or discharge from therapy. If you have any questions/comments please contact us directly at [phone]. Thank you for allowing us to assist in the care of your patient. LPTA Signature: Sai Dobbs PTA  Date: 9/12/2019   PT Signature: LOUIS Hunter Time: 9:23 AM   NOTE TO PHYSICIAN:  PLEASE COMPLETE THE ORDERS BELOW AND FAX TO   [facility] UNIT: [phone]. If you are unable to process this request in 24 hours please contact our office: [phone].    ___ I have read the above report and request that my patient continue as recommended.   ___ I have read the above report and request that my patient continue therapy with the following changes/special instructions:_________________________________________________________   ___ I have read the above report and request that my patient be discharged from therapy.      Physician Signature:        Date:       Time:

## 2019-09-12 NOTE — PROGRESS NOTES
PT DAILY TREATMENT NOTE 8-14    Patient Name: Foreign Pringle  Date:2019  : 1970  [x]  Patient  Verified  Payor: Joan Prater / [facility] [employer]  [facility] Product Type: Commerical /    In time:9:11  Out time:10:37  Total Treatment Time (min): 86  Total Timed Codes (min): 80  1:1 Treatment Time (min):    Visit #: 15 of 17    Treatment Area: Left shoulder pain [M25.512]    SUBJECTIVE  Pain Level (0-10 scale): 0  Any medication changes, allergies to medications, adverse drug reactions, diagnosis change, or new procedure performed?: [x] No    [] Yes (see summary sheet for update)  Subjective functional status/changes:   [] No changes reported  My shoulder feels pretty good today, no pain, my triceps are just a little bit sore from doing the bands the other day. OBJECTIVE    72 min Therapeutic Exercise:  [x] See flow sheet :   Rationale: increase ROM and increase strength to improve the patients ability to lift,reach and carry    14 min Manual Therapy:  STM/tissue mobs to posterior scapular/cervical musculature/CFM to proximal biceps tendon, sidelying scapular glides and GH PROM in all planes   Rationale: increase ROM, increase tissue extensibility and decrease trigger points to improve functional mobility            min Patient Education: [x] Review HEP    [] Progressed/Changed HEP based on:   [] positioning   [] body mechanics   [] transfers   [] heat/ice application        Other Objective/Functional Measures:     Pain Level (0-10 scale) post treatment: 0    ASSESSMENT/Changes in Function:     Patient will continue to benefit from skilled PT services to modify and progress therapeutic interventions, address functional mobility deficits, address ROM deficits, address strength deficits, analyze and address soft tissue restrictions, analyze and cue movement patterns, analyze and modify body mechanics/ergonomics and assess and modify postural abnormalities to attain remaining goals.      []  See Plan of Care  [x]  See progress note/recertification  []  See Discharge Summary         Progress towards goals / Updated goals:  See Progress note/Physician update for full detailed progress towards established goals.     PLAN  [x]  Upgrade activities as tolerated     []  Continue plan of care  []  Update interventions per flow sheet       []  Discharge due to:_  []  Other:_      Lj Lima PTA 9/12/2019  9:11 AM      Future Appointments   Date Time Provider Sherry Bar   9/12/2019  9:15 AM Irina Blunt PTA [facility]   9/18/2019  2:45 PM Irina Blunt PTA [facility]   9/20/2019  7:00 AM Irina Blunt PTA [facility]

## 2019-09-18 ENCOUNTER — HOSPITAL ENCOUNTER (OUTPATIENT)
Dept: PHYSICAL THERAPY | Age: 49
Discharge: HOME | End: 2019-09-18
Payer: COMMERCIAL

## 2019-09-18 PROCEDURE — 97110 THERAPEUTIC EXERCISES: CPT

## 2019-09-18 NOTE — PROGRESS NOTES
PT DAILY TREATMENT NOTE 8-14    Patient Name: Naomi Fry  Date:2019  : 1970  [x]  Patient  Verified  Payor: Nazanin Lai / Plan[facility] Product Type: Commerical /    In time:2:43  Out time:3:38  Total Treatment Time (min): 55  Total Timed Codes (min): 49  1:1 Treatment Time (min):    Visit #:     Treatment Area: Left shoulder pain [M25.512]    SUBJECTIVE  Pain Level (0-10 scale): 0  Any medication changes, allergies to medications, adverse drug reactions, diagnosis change, or new procedure performed?: [x] No    [] Yes (see summary sheet for update)  Subjective functional status/changes:   [] No changes reported  My shoulder has been feeling pretty good since I was here, I have even been working it pretty good over the past couple of days and it has been feeling pretty good afterwards. OBJECTIVE      55 min Therapeutic Exercise:  [x] See flow sheet :   Rationale: increase ROM and increase strength to improve the patients ability to improve functional abilities              min Patient Education: [x] Review HEP    [] Progressed/Changed HEP based on:   [] positioning   [] body mechanics   [] transfers   [] heat/ice application        Other Objective/Functional Measures:     Pain Level (0-10 scale) post treatment: 0    ASSESSMENT/Changes in Function: Pt was able to advance to 3 way body blade exercises with min to mod challenge with RTC endurance today. Pt needs main focus on strengthening and stability, but presents with respectable AROM in all planes, therefore manual intervention was held today. Will continue to progress/advance patient within current POC as tolerated with monitoring symptoms.     Patient will continue to benefit from skilled PT services to modify and progress therapeutic interventions, address functional mobility deficits, address ROM deficits, address strength deficits, analyze and address soft tissue restrictions, analyze and cue movement patterns, analyze and modify body mechanics/ergonomics and assess and modify postural abnormalities to attain remaining goals. []  See Plan of Care  []  See progress note/recertification  []  See Discharge Summary         Progress towards goals / Updated goals:  New Goals to be achieved in __8__  treatments: 1. Pt will improve FOTO score to >/= 67/100 to demo a significant improve in functional activity tolerance  2. Pt will demo L shoulder FIR AROM to at least T10 for ease of self-care  3. Improve L shoulder abduction, ER and IR MMT by =/> 1/3 muscle grade for progression towards return to full work duties    PLAN  [x]  Upgrade activities as tolerated     []  Continue plan of care  []  Update interventions per flow sheet       []  Discharge due to:_  []  Other:_      Matti Gibbs PTA 9/18/2019  2:42 PM      Future Appointments   Date Time Provider Sherry Bar   9/18/2019  2:45 PM Brennan Zuniga PTA [facility]   9/20/2019  7:00 AM Brennan Zuniga PTA [facility]

## 2019-09-20 ENCOUNTER — HOSPITAL ENCOUNTER (OUTPATIENT)
Dept: PHYSICAL THERAPY | Age: 49
Discharge: HOME | End: 2019-09-20
Payer: COMMERCIAL

## 2019-09-20 PROCEDURE — 97110 THERAPEUTIC EXERCISES: CPT

## 2019-09-20 NOTE — PROGRESS NOTES
PT DAILY TREATMENT NOTE     Patient Name: Dalia Cavanaugh  Date:2019  : 1970  [x]  Patient  Verified  Payor: Jesús Freed / Plan: [employer]  CAPITATED PT / Product Type: Commerical /    In time:7:05  Out time:7:58  Total Treatment Time (min): 53  Total Timed Codes (min): 47  1:1 Treatment Time (min):    Visit #:     Treatment Area: Left shoulder pain [M25.512]    SUBJECTIVE  Pain Level (0-10 scale): 0  Any medication changes, allergies to medications, adverse drug reactions, diagnosis change, or new procedure performed?: [x] No    [] Yes (see summary sheet for update)  Subjective functional status/changes:   [] No changes reported  I had a flu shot on Tuesday and I think that it affected my whole body because I was sore and fatigued all over for a couple of days after that, but I felt pretty good after bumping up the exercises last time. OBJECTIVE    53 min Therapeutic Exercise:  [x] See flow sheet :   Rationale: increase ROM and increase strength to improve the patients ability to improve functional abilities           min Patient Education: [x] Review HEP    [] Progressed/Changed HEP based on:   [] positioning   [] body mechanics   [] transfers   [] heat/ice application        Other Objective/Functional Measures:     Pain Level (0-10 scale) post treatment: 0    ASSESSMENT/Changes in Function: Pt was able to increase from 2 to 3 lbs with scaption and abduction PRE's to 90 degrees as well as increased resistance with medicine ball wall circles with min to mod challenge with RTC strength and endurance today. Pt continues to make good progress with strength and stability with current POC. Will continue to progress/advance patient within current POC as tolerated with monitoring symptoms.     Patient will continue to benefit from skilled PT services to modify and progress therapeutic interventions, address functional mobility deficits, address ROM deficits, address strength deficits, analyze and cue movement patterns, analyze and modify body mechanics/ergonomics and assess and modify postural abnormalities to attain remaining goals. []  See Plan of Care  []  See progress note/recertification  []  See Discharge Summary         Progress towards goals / Updated goals:  New Goals to be achieved in __8__  treatments: 1. Pt will improve FOTO score to >/= 67/100 to demo a significant improve in functional activity tolerance  2. Pt will demo L shoulder FIR AROM to at least T10 for ease of self-care  3. Improve L shoulder abduction, ER and IR MMT by =/> 1/3 muscle grade for progression towards return to full work duties       PLAN  [x]  Upgrade activities as tolerated     []  Continue plan of care  []  Update interventions per flow sheet       []  Discharge due to:_  []  Other:_      Alpesh Hilario PTA 9/20/2019  7:17 AM      Future Appointments   Date Time Provider Sherry Bar   9/25/2019 10:30 AM Emiliano Turner, PT [facility]

## 2019-09-25 ENCOUNTER — HOSPITAL ENCOUNTER (OUTPATIENT)
Dept: PHYSICAL THERAPY | Age: 49
Discharge: HOME | End: 2019-09-25
Payer: COMMERCIAL

## 2019-09-25 PROCEDURE — 97110 THERAPEUTIC EXERCISES: CPT

## 2019-09-25 NOTE — PROGRESS NOTES
PHYSICAL THERAPY - DAILY TREATMENT NOTE    Patient Name: Herminia Horn        Date: 2019  : 1970   yes Patient  Verified  Visit #:     Insurance: Payor: Desmond Keep / Plan: [address] [place] [address] Rd PT / Product Type: Commerical /      In time: 10:20 Out time: 11:10   Total Treatment Time: 50     Medicare/[facility] Time Tracking (below)   Total Timed Codes (min):  na 1:1 Treatment Time:  na     TREATMENT AREA =  Left shoulder pain [M25.512]    SUBJECTIVE  Pain Level (on 0 to 10 scale):  0  / 10   Medication Changes/New allergies or changes in medical history, any new surgeries or procedures?    no  If yes, update Summary List   Subjective Functional Status/Changes:  []  No changes reported     Pt reports he attempted to lift a 40-50# bag of dog food from the floor and had to drop it because it was too heavy. Denied pain. OBJECTIVE    50 min Therapeutic Exercise:  [x]  See flow sheet   Rationale:      increase ROM and increase strength to improve the patients ability to lift     Billed With/As:   [x] TE   [] TA   [] Neuro   [] Self Care Patient Education: [x] Review HEP    [] Progressed/Changed HEP based on:   [] positioning   [] body mechanics   [] transfers   [] heat/ice application    [] other:      Other Objective/Functional Measures: Added multiple therex per flow to improve L shoulder stability  Pt requiring cueing to prevent lumbar hyperextension t/o overhead activity  Mild scapular winging noted in UE closed-chain positions though able to improve w/ cueing     Post Treatment Pain Level (on 0 to 10) scale:   9  / 10     ASSESSMENT  Assessment/Changes in Function:     Pt w/ good tolerance to progression of advanced strengthening exercise.  Will benefit from continued progression to improve ability to complete work duties and general ADLs     []  See Progress Note/Recertification   Patient will continue to benefit from skilled PT services to modify and progress therapeutic interventions, address functional mobility deficits, address ROM deficits, address strength deficits, analyze and address soft tissue restrictions, analyze and cue movement patterns, analyze and modify body mechanics/ergonomics, assess and modify postural abnormalities and instruct in home and community integration to attain remaining goals. Progress toward goals / Updated goals: 1. Pt will improve FOTO score to >/= 67/100 to demo a significant improve in functional activity tolerance  2. Pt will demo L shoulder FIR AROM to at least T10 for ease of self-care  3. Improve L shoulder abduction, ER and IR MMT by =/> 1/3 muscle grade for progression towards return to full work duties 9/25/19: addressed w/ advanced strengthening this visit.  Goal in progress     PLAN  []  Upgrade activities as tolerated yes Continue plan of care   []  Discharge due to :    []  Other:      Therapist: Yossi Beckett PT    Date: 9/25/2019 Time: 10:21 AM     Future Appointments   Date Time Provider [place]   9/25/2019 10:30 AM Pradeep Savage, PT [facility]   9/27/2019  7:00 AM Concepción Naik PTA [facility]   10/1/2019  7:45 AM Concepción Naik, PTA [facility]   10/4/2019  7:00 AM Dano Turner, PT [facility]   10/8/2019  7:45 AM Concepción Naik, PTA [facility]   10/10/2019  7:45 AM Concepción Naik, SHERYL [facility]   10/14/2019  7:45 AM Phan RAINES PT [facility]   10/17/2019  7:00 AM Dano Turner, PT [facility]   10/21/2019  7:00 AM Alan Sewell, PT [facility]   10/24/2019  7:45 AM Concepción Naik PTA [facility]   10/28/2019  7:00 AM Phan RAINES, PT [facility]   10/31/2019  7:00 AM Yue, Dearonny Armstrong, PT [facility]

## 2019-09-27 ENCOUNTER — HOSPITAL ENCOUNTER (OUTPATIENT)
Dept: PHYSICAL THERAPY | Age: 49
Discharge: HOME | End: 2019-09-27
Payer: COMMERCIAL

## 2019-09-27 PROCEDURE — 97110 THERAPEUTIC EXERCISES: CPT

## 2019-09-27 NOTE — PROGRESS NOTES
PT DAILY TREATMENT NOTE 8-14    Patient Name: Isaac Bowers  Date:2019  : 1970  [x]  Patient  Verified  Payor: Latasha Stable / Plan: [employer]  [employer] PT / Product Type: Commerical /    In time:7:00  Out time:7:47  Total Treatment Time (min): 47  Total Timed Codes (min): 41  1:1 Treatment Time (min):    Visit #:     Treatment Area: Left shoulder pain [M25.512]    SUBJECTIVE  Pain Level (0-10 scale): 0  Any medication changes, allergies to medications, adverse drug reactions, diagnosis change, or new procedure performed?: [x] No    [] Yes (see summary sheet for update)  Subjective functional status/changes:   [] No changes reported  My shoulder was a little bit more sore than usual after doing those new exercises last time, but it was good, I think it is what I need to get my shoulder stronger. OBJECTIVE      47 min Therapeutic Exercise:  [x] See flow sheet :   Rationale: increase ROM and increase strength to improve the patients ability to improve functional abilities            min Patient Education: [x] Review HEP    [] Progressed/Changed HEP based on:   [] positioning   [] body mechanics   [] transfers   [] heat/ice application        Other Objective/Functional Measures:     Pain Level (0-10 scale) post treatment: 47    ASSESSMENT/Changes in Function: Pt was able to advance to dumb bell bent over rows and suit case carries as well as [place] lat pull downs with min to mod challenge today. Pt however reported the most challenge/fatigue with plank taps last treatment as well as today. Will continue to progress/advance patient within current POC as tolerated with monitoring symptoms.     Patient will continue to benefit from skilled PT services to modify and progress therapeutic interventions, address functional mobility deficits, address ROM deficits, address strength deficits, analyze and cue movement patterns, analyze and modify body mechanics/ergonomics and assess and modify postural abnormalities to attain remaining goals. []  See Plan of Care  []  See progress note/recertification  []  See Discharge Summary         Progress towards goals / Updated goals: 1. Pt will improve FOTO score to >/= 67/100 to demo a significant improve in functional activity tolerance  2. Pt will demo L shoulder FIR AROM to at least T10 for ease of self-care  3. Improve L shoulder abduction, ER and IR MMT by =/> 1/3 muscle grade for progression towards return to full work duties 9/25/19: addressed w/ advanced strengthening this visit.  Goal in progress    PLAN  [x]  Upgrade activities as tolerated     []  Continue plan of care  []  Update interventions per flow sheet       []  Discharge due to:_  []  Other:_      Kala Gann PTA 9/27/2019  6:59 AM      Future Appointments   Date Time Provider Sherry Bar   9/27/2019  7:00 AM Keisha Callejas, PTA [facility]   10/1/2019  7:45 AM Keisha Callejas, PTA [facility]   10/4/2019  7:00 [facility]   10/8/2019  7:45 AM Keisha Callejas, PTA [facility]   10/10/2019  7:45 AM Keisha Callejas, PTA [facility]   10/14/2019  7:45 AM Vidhi Marshall, PT [facility]   10/17/2019  7:00 AM Essence Turner noah, PT [facility]   10/21/2019  7:00 AM Vidhi Marshall, PT [facility]   10/24/2019  7:45 AM Keisha Callejas, PTA [facility]   10/28/2019  7:00 AM Delio RAINES, PT [facility]   10/31/2019  7:00 AM Essence Turner noah, PT [facility]

## 2019-10-01 ENCOUNTER — HOSPITAL ENCOUNTER (OUTPATIENT)
Dept: PHYSICAL THERAPY | Age: 49
Discharge: HOME | End: 2019-10-01
Payer: COMMERCIAL

## 2019-10-01 PROCEDURE — 97110 THERAPEUTIC EXERCISES: CPT

## 2019-10-01 NOTE — PROGRESS NOTES
PT DAILY TREATMENT NOTE 8-14    Patient Name: Felix Brown  Date:10/1/2019  : 1970  [x]  Patient  Verified  Payor: Keri Delaney / Plan: [employer]  [employer]IT[employer] PT / Product Type: Commerical /    In time:7:40  Out time:8:29  Total Treatment Time (min): 49  Total Timed Codes (min): 43  1:1 Treatment Time (min):    Visit #:     Treatment Area: Left shoulder pain [M25.512]    SUBJECTIVE  Pain Level (0-10 scale): 0  Any medication changes, allergies to medications, adverse drug reactions, diagnosis change, or new procedure performed?: [x] No    [] Yes (see summary sheet for update)  Subjective functional status/changes:   [] No changes reported  My shoulder is a little bit sore this morning, I think that I slept on it wrong or something because I woke up that way, but otherwise it has been feeling pretty good. OBJECTIVE      49 min Therapeutic Exercise:  [x] See flow sheet :   Rationale: increase ROM and increase strength to improve the patients ability to improve functional abilities            min Patient Education: [x] Review HEP    [] Progressed/Changed HEP based on:   [] positioning   [] body mechanics   [] transfers   [] heat/ice application        Other Objective/Functional Measures:     Pain Level (0-10 scale) post treatment: 0    ASSESSMENT/Changes in Function: Pt was able to tolerate increased reps with plank taps with moderate challenge today. Otherwise, pt presents with only c/o increase in shoulder symptoms with awkward sleep positioning since last treatment. Will continue to progress/advance patient within current POC as tolerated with monitoring symptoms.     Patient will continue to benefit from skilled PT services to modify and progress therapeutic interventions, address functional mobility deficits, address ROM deficits, address strength deficits, analyze and cue movement patterns, analyze and modify body mechanics/ergonomics and assess and modify postural abnormalities to attain remaining goals. []  See Plan of Care  []  See progress note/recertification  []  See Discharge Summary         Progress towards goals / Updated goals: 1. Pt will improve FOTO score to >/= 67/100 to demo a significant improve in functional activity tolerance  2. Pt will demo L shoulder FIR AROM to at least T10 for ease of self-care  3. Improve L shoulder abduction, ER and IR MMT by =/> 1/3 muscle grade for progression towards return to full work duties 9/25/19: addressed w/ advanced strengthening this visit.  Goal in progress       PLAN  [x]  Upgrade activities as tolerated     []  Continue plan of care  []  Update interventions per flow sheet       []  Discharge due to:_  []  Other:_      Grupo Heath PTA 10/1/2019  8:02 AM      Future Appointments   Date Time Provider [place]ry [place]   10/4/2019  7:00 AM Buck Turner, PT [facility]   10/8/2019  7:45 AM Daniel Yoder, PTA [facility]   10/10/2019  7:45 AM Daniel Yoder, PTA [facility]   10/14/2019  7:45 AM Marimar RAINES, PT [facility]   10/17/2019  7:00 AM Buck Turner, PT [facility]   10/21/2019  7:00 AM Ernesto Tapan., PT [facility]   10/24/2019  7:45 AM Daniel Yoder, PTA [facility]   10/28/2019  7:00 AM Ernesto Tapan., PT [facility]   10/31/2019  7:00 AM Buck Turner, PT [facility]

## 2019-10-04 ENCOUNTER — HOSPITAL ENCOUNTER (OUTPATIENT)
Dept: PHYSICAL THERAPY | Age: 49
Discharge: HOME | End: 2019-10-04
Payer: COMMERCIAL

## 2019-10-04 PROCEDURE — 97110 THERAPEUTIC EXERCISES: CPT

## 2019-10-04 NOTE — PROGRESS NOTES
PHYSICAL THERAPY - DAILY TREATMENT NOTE    Patient Name: Wendi Ruiz        Date: 10/4/2019  : 1970   yes Patient  Verified  Visit #:     Insurance: Payor: Daniel Cross / Plan:  [employer] PT / Product Type: Commerical /      In time: 7:00 Out time: 7:43   Total Treatment Time: 43     Medicare/[facility] Time Tracking (below)   Total Timed Codes (min):  na 1:1 Treatment Time:  na     TREATMENT AREA =  Left shoulder pain [M25.512]    SUBJECTIVE  Pain Level (on 0 to 10 scale):  0  / 10   Medication Changes/New allergies or changes in medical history, any new surgeries or procedures?    no  If yes, update Summary List   Subjective Functional Status/Changes:  []  No changes reported     Pt reports his shoulder has been popping a lot, non-painful. Soreness following exercise but no pain       OBJECTIVE    43 min Therapeutic Exercise:  [x]  See flow sheet   Rationale:      increase ROM, increase strength and improve coordination to improve the patients ability to complete work duties    Billed With/As:   [x] TE   [] TA   [] Neuro   [] Self Care Patient Education: [x] Review HEP    [] Progressed/Changed HEP based on:   [] positioning   [] body mechanics   [] transfers   [] heat/ice application    [] other:      Other Objective/Functional Measures:    Progressed resistance per flow to improve strength L UE  palpable clicking noted at anterosuperior GHJ, no instability noted or c/o pain; performed therex within limits prior to popping  Progressed b/u carry to 1/2 kneel b/u press to improve GHJ stability in overhead position     Post Treatment Pain Level (on 0 to 10) scale:   0  / 10     ASSESSMENT  Assessment/Changes in Function:     Pt making excellent progress towards goals, AROM of the L shoulder WNL flex, abd, ER; [facility] FIR.       []  See Progress Note/Recertification   Patient will continue to benefit from skilled PT services to modify and progress therapeutic interventions, address functional mobility deficits, address ROM deficits, address strength deficits, analyze and address soft tissue restrictions, analyze and cue movement patterns, analyze and modify body mechanics/ergonomics, assess and modify postural abnormalities and instruct in home and community integration to attain remaining goals. Progress toward goals / Updated goals: 1. Pt will improve FOTO score to >/= 67/100 to demo a significant improve in functional activity tolerance  2. Pt will demo L shoulder FIR AROM to at least T10 for ease of self-care 10/4/19: goal in progress, FIR to T12  3. Improve L shoulder abduction, ER and IR MMT by =/> 1/3 muscle grade for progression towards return to full work duties 9/25/19: addressed w/ advanced strengthening this visit.  Goal in progress       PLAN  []  Upgrade activities as tolerated yes Continue plan of care   []  Discharge due to :    []  Other:      Therapist: Damien Hyatt PT    Date: 10/4/2019 Time: 7:09 AM     Future Appointments   Date Time Provider Sherry Bar   10/8/2019  7:45 AM Reuben Gola, PTA [facility]   10/10/2019  7:45 AM Bridgton Gola, PTA [facility]   10/14/2019  7:45 AM Lela RAINES, PT [facility]   10/17/2019  7:00 AM Sacha Turner, PT [facility]   10/21/2019  7:00 AM Lou Koenig, PT [facility]   10/24/2019  7:45 AM Reuben Jamesa, PTA [facility]   10/28/2019  7:00 AM Lela RAINES, PT [facility]   10/31/2019  7:00 AM Sacha Turner, PT [facility]

## 2019-10-08 ENCOUNTER — HOSPITAL ENCOUNTER (OUTPATIENT)
Dept: PHYSICAL THERAPY | Age: 49
Discharge: HOME | End: 2019-10-08
Payer: COMMERCIAL

## 2019-10-08 PROCEDURE — 97110 THERAPEUTIC EXERCISES: CPT

## 2019-10-08 NOTE — PROGRESS NOTES
PT DAILY TREATMENT NOTE     Patient Name: Jailene Smith  Date:10/8/2019  : 1970  [x]  Patient  Verified  Payor: Bethel Zheng / Plan[facility] [employer]  [facility] Product Type: Commerical /    In time:7:45  Out time:8:39  Total Treatment Time (min): 54  Total Timed Codes (min): 48  1:1 Treatment Time (min):    Visit #:     Treatment Area: Left shoulder pain [M25.512]    SUBJECTIVE  Pain Level (0-10 scale): 0  Any medication changes, allergies to medications, adverse drug reactions, diagnosis change, or new procedure performed?: [x] No    [] Yes (see summary sheet for update)  Subjective functional status/changes:   [] No changes reported  My shoulder was a little bit sore over the weekend after we bumped up the exercises the last time I was here, but it wasn't too bad, I think that I can do it again. OBJECTIVE    54 min Therapeutic Exercise:  [x] See flow sheet :   Rationale: increase ROM and increase strength to improve the patients ability to improve functional abilities            min Patient Education: [x] Review HEP    [] Progressed/Changed HEP based on:   [] positioning   [] body mechanics   [] transfers   [] heat/ice application        Other Objective/Functional Measures:     Pain Level (0-10 scale) post treatment: 0    ASSESSMENT/Changes in Function: Pt presented with only c/o mild post exercise soreness after advancing therex regiment last treatment, but was able to tolerate same regiment today including increasing resistance from 3 to 4 lbs with scaption PRE's to 90 degrees with min to mod challenge. Will review detailed progress/LTGs for planned discharge next visit.     Patient will continue to benefit from skilled PT services to modify and progress therapeutic interventions, address functional mobility deficits, address ROM deficits, address strength deficits, analyze and cue movement patterns, analyze and modify body mechanics/ergonomics and assess and modify postural abnormalities to attain remaining goals. []  See Plan of Care  []  See progress note/recertification  []  See Discharge Summary         Progress towards goals / Updated goals: 1. Pt will improve FOTO score to >/= 67/100 to demo a significant improve in functional activity tolerance  2. Pt will demo L shoulder FIR AROM to at least T10 for ease of self-care 10/4/19: goal in progress, FIR to T12  3. Improve L shoulder abduction, ER and IR MMT by =/> 1/3 muscle grade for progression towards return to full work duties 9/25/19: addressed w/ advanced strengthening this visit.  Goal in progress    PLAN  [x]  Upgrade activities as tolerated     []  Continue plan of care  []  Update interventions per flow sheet       []  Discharge due to:_  []  Other:_      Bere Harvey PTA 10/8/2019  7:57 AM      Future Appointments   Date Time Provider Sherry Bar   10/10/2019  7:45 AM Sherry Sprain, PTA [facility]   10/14/2019  7:45 AM Kb RAINES, PT [facility]   10/17/2019  7:00 AM Aries Turner, PT [facility]   10/21/2019  7:00 AM Kendrick Cables., PT [facility]   10/24/2019  7:45 AM Sherry Ramirezain, PTA [facility]   10/28/2019  7:00 AM Kendrick Cables., PT [facility]   10/31/2019  7:00 AM Aries Turner, PT [facility]

## 2019-10-10 ENCOUNTER — HOSPITAL ENCOUNTER (OUTPATIENT)
Dept: PHYSICAL THERAPY | Age: 49
Discharge: HOME | End: 2019-10-10
Payer: COMMERCIAL

## 2019-10-10 PROCEDURE — 97110 THERAPEUTIC EXERCISES: CPT

## 2019-10-10 NOTE — PROGRESS NOTES
[facility] AT [address], [address] [place] [address] Road  Phone: [phone]   [phone]  DISCHARGE SUMMARY  Patient Name: Jamar Mccray : 1970   Treatment/Medical Diagnosis: Left shoulder pain [M25.512]   Referral Source: Judah Quevedo MD     Date of Initial Visit: 19 Attended Visits: 23 Missed Visits: 0     SUMMARY OF TREATMENT  Pt has attended 23 sessions of PT for the tx of L shoulder p! S/p closed displaced fx of lesser tuberosity of humerus. PT tx has consisted of therex, manual therapy, and HEP in order to improve ROM, joint mobility, flexibility, and dec pain.     CURRENT STATUS  Patient reports approximately 95-98% overall improvement from therapy since initial evaluation with 1/10 pain at the worst with increased repetitive and lifting type ADL's. Pt has made excellent progress with gaining shoulder and scapular mobility as well as advancing with strengthening with increasing to more advanced shoulder strengthening and stabilization exercises over the past few visits within current POC. Patient has achieved maximum medical benefit/potential from current POC after completing 23 of 23 prescribed visits and is ready for discharge from therapy at this time. Left shoulder AROM= Krqcues=149 degrees; Ymlinfcrc=045 degrees; ER=90 degrees (measured at approximately 90 degrees of abduction); IR=Pt is able to actively reach behind his back into IR to approximately L2 level  Left shoulder strength measurements/MMT= Flexion=5/5; Abduction=5/5; ER=4+/5; IR=5/5  Goal/Measure of Progress Goal Met? 1. Pt will improve FOTO score to >/= 67/100 to demo a significant improve in functional activity tolerance   Status at last Eval: 63/100 Current Status: 72/100 yes   2. Pt will demo L shoulder FIR AROM to at least T10 for ease of self-care   Status at last Eval: FIR AROM to L2 Current Status: FIR AROM to L2 no   3.   Improve L shoulder abduction, ER and IR MMT by =/> 1/3 muscle grade for progression towards return to full work duties   Status at last Eval: =/> 4/5 except for IR which is 4-/5 Current Status: Met yes   RECOMMENDATIONS  Patient has achieved maximum medical benefit/potential from current POC after completing 23 of 23 prescribed visits and is ready for discharge from therapy at this time. If you have any questions/comments please contact us directly at [phone]. Thank you for allowing us to assist in the care of your patient.     LPTA Signature: Oswaldo Dimas PTA Date: 10/10/19   Therapist Signature: LOUIS Dickerson Time: 7:52 AM

## 2019-10-10 NOTE — PROGRESS NOTES
PT DAILY TREATMENT NOTE     Patient Name: Eric Chiang  Date:10/10/2019  : 1970  [x]  Patient  Verified  Payor: Juana Tidwell / Plan: [place] [employer]  [facility] Product Type: Commerical /    In time:7:52  Out time:8:45  Total Treatment Time (min): 53  Total Timed Codes (min): 47  1:1 Treatment Time (min):    Visit #: 23 of 23    Treatment Area: Left shoulder pain [M25.512]    SUBJECTIVE  Pain Level (0-10 scale): 0  Any medication changes, allergies to medications, adverse drug reactions, diagnosis change, or new procedure performed?: [x] No    [] Yes (see summary sheet for update)  Subjective functional status/changes:   [] No changes reported  My shoulder has been feeling pretty good overall, it has only been a little bit sore on and off mainly after bumping up the exercises and when I raise it up at certain angles. OBJECTIVE      53 min Therapeutic Exercise:  [x] See flow sheet :   Rationale: increase ROM and increase strength to improve the patients ability to improve functional abilities           min Patient Education: [x] Review HEP    [] Progressed/Changed HEP based on:   [] positioning   [] body mechanics   [] transfers   [] heat/ice application        Other Objective/Functional Measures:     Pain Level (0-10 scale) post treatment: 0    ASSESSMENT/Changes in Function:    Patient will continue to benefit from skilled PT services to modify and progress therapeutic interventions, address functional mobility deficits, address ROM deficits, address strength deficits, analyze and cue movement patterns, analyze and modify body mechanics/ergonomics and assess and modify postural abnormalities to attain remaining goals. []  See Plan of Care  []  See progress note/recertification  [x]  See Discharge Summary         Progress towards goals / Updated goals:  See discharge summary for full final detailed progress towards all established goals.      PLAN  []  Upgrade activities as tolerated     [] Continue plan of care  []  Update interventions per flow sheet       [x]  Discharge due to:Patient has achieved maximum medical benefit/potential from current POC after completing 23 of 23 prescribed visits and is ready for discharge from therapy at this time.   []  Other:_      Katia Antoine, SHERYL 10/10/2019  7:27 AM      Future Appointments   Date Time Provider Sherry Dipika   10/10/2019  7:45 AM Beverly Edward, PTA [facility]   10/14/2019  7:45 AM Gita Niofelia., PT [facility]   10/17/2019  7:00 AM Rekha Turner, PT [facility]   10/21/2019  7:00 AM Gita Peterson., PT [facility]   10/24/2019  7:45 AM Beverly Edward, SHERYL [facility]   10/28/2019  7:00 AM Gitasa Peterson., PT [facility]   10/31/2019  7:00 AM Rekha Turner, PT [facility]

## 2019-10-14 ENCOUNTER — APPOINTMENT (OUTPATIENT)
Dept: PHYSICAL THERAPY | Age: 49
End: 2019-10-14
Payer: COMMERCIAL

## 2019-10-17 ENCOUNTER — APPOINTMENT (OUTPATIENT)
Dept: PHYSICAL THERAPY | Age: 49
End: 2019-10-17
Payer: COMMERCIAL

## 2019-10-21 ENCOUNTER — APPOINTMENT (OUTPATIENT)
Dept: PHYSICAL THERAPY | Age: 49
End: 2019-10-21
Payer: COMMERCIAL

## 2019-10-24 ENCOUNTER — APPOINTMENT (OUTPATIENT)
Dept: PHYSICAL THERAPY | Age: 49
End: 2019-10-24
Payer: COMMERCIAL

## 2019-10-28 ENCOUNTER — APPOINTMENT (OUTPATIENT)
Dept: PHYSICAL THERAPY | Age: 49
End: 2019-10-28
Payer: COMMERCIAL

## 2019-10-31 ENCOUNTER — APPOINTMENT (OUTPATIENT)
Dept: PHYSICAL THERAPY | Age: 49
End: 2019-10-31
Payer: COMMERCIAL

## 2021-09-27 ENCOUNTER — APPOINTMENT (OUTPATIENT)
Dept: PHYSICAL THERAPY | Age: 51
End: 2021-09-27